# Patient Record
(demographics unavailable — no encounter records)

---

## 2024-11-25 NOTE — HISTORY OF PRESENT ILLNESS
[de-identified] : 42-year-old female here for follow-up diabetes. History of Bell's palsy 7/2024, cervical radiculopathy, type 2 diabetes, hyperlipidemia anxiety/depression.  Since last visit with me he has seen neurology for Bell's palsy workup, advised to continue aspirin and current medications.  Had neurosurgery evaluation regarding MRI cervical spine findings with left lateral C6-7 broad based protrusion with moderate to severe left foraminal stenosis.   She is pending xrays cervical spine and physical therapy appointments.   Compliant with DM meds and no refills needed at this time.

## 2025-05-01 NOTE — HISTORY OF PRESENT ILLNESS
[de-identified] : 42-year-old female here for follow-up diabetes. Last A1C 7.0% in 2/2025 which was decreased compared to 7.5% in November 2024. She is tolerating metformin 1000 mg twice daily, Januvia 100 mg daily, Jardiance 25 mg daily. LDL 34, well-controlled on rosuvastatin 20 mg daily.  Upcoming travel for 2-3 months and needs medication refills. Has flight anxiety, almost 10 hours and asking for medications to assist with travel anxiety.